# Patient Record
Sex: FEMALE | Race: OTHER | HISPANIC OR LATINO | URBAN - METROPOLITAN AREA
[De-identification: names, ages, dates, MRNs, and addresses within clinical notes are randomized per-mention and may not be internally consistent; named-entity substitution may affect disease eponyms.]

---

## 2023-11-24 ENCOUNTER — EMERGENCY (EMERGENCY)
Facility: HOSPITAL | Age: 23
LOS: 0 days | Discharge: HOME | End: 2023-11-24
Attending: EMERGENCY MEDICINE
Payer: COMMERCIAL

## 2023-11-24 VITALS
DIASTOLIC BLOOD PRESSURE: 75 MMHG | OXYGEN SATURATION: 100 % | TEMPERATURE: 98 F | HEART RATE: 62 BPM | SYSTOLIC BLOOD PRESSURE: 105 MMHG | RESPIRATION RATE: 17 BRPM

## 2023-11-24 VITALS — HEIGHT: 61 IN | WEIGHT: 134.92 LBS

## 2023-11-24 DIAGNOSIS — R22.31 LOCALIZED SWELLING, MASS AND LUMP, RIGHT UPPER LIMB: ICD-10-CM
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
## 2023-11-24 DIAGNOSIS — J45.909 UNSPECIFIED ASTHMA, UNCOMPLICATED: ICD-10-CM

## 2023-11-24 DIAGNOSIS — S60.221A CONTUSION OF RIGHT HAND, INITIAL ENCOUNTER: ICD-10-CM

## 2023-11-24 PROCEDURE — 99283 EMERGENCY DEPT VISIT LOW MDM: CPT | Mod: 25

## 2023-11-24 PROCEDURE — 73130 X-RAY EXAM OF HAND: CPT | Mod: RT

## 2023-11-24 PROCEDURE — 73130 X-RAY EXAM OF HAND: CPT | Mod: 26,RT

## 2023-11-24 PROCEDURE — 99284 EMERGENCY DEPT VISIT MOD MDM: CPT

## 2023-11-24 RX ORDER — ACETAMINOPHEN 500 MG
650 TABLET ORAL ONCE
Refills: 0 | Status: COMPLETED | OUTPATIENT
Start: 2023-11-24 | End: 2023-11-24

## 2023-11-24 RX ADMIN — Medication 650 MILLIGRAM(S): at 17:08

## 2023-11-24 NOTE — ED STATDOCS - CLINICAL SUMMARY MEDICAL DECISION MAKING FREE TEXT BOX
23 year old female with PMHx of asthma; presents to the ED c/o right hand swelling and pain, patient states she got her nails done and following accidentally hit her right hand while doing laundry. Right hand dominant. NKDA. Took Ibuprofen PTA.  xray, tylenol, dc

## 2023-11-24 NOTE — ED ADULT NURSE NOTE - OBJECTIVE STATEMENT
Pt came into the ED with c/o right sided hand pain after "banging her hand". Pt reports it happened after getting her nails done. pt denies any other symptoms or complaints at this time. pt given pain medications.

## 2023-11-24 NOTE — ED STATDOCS - PATIENT PORTAL LINK FT
You can access the FollowMyHealth Patient Portal offered by [facility] by registering at the following website: [email or web address]. By joining [employer]’s FollowMyHealth portal, you will also be able to view your health information using other applications (apps) compatible with our system.

## 2023-11-24 NOTE — ED STATDOCS - PROGRESS NOTE DETAILS
Emeli PAC: pt here with right hand pain after banging her fingers. no visible deformity. prelim xray neg. Discussed with patient need to return to ED if symptoms don't continue to improve or recur or develops any new or worsening symptoms that are of concern.

## 2023-11-24 NOTE — ED STATDOCS - NS ED ATTENDING STATEMENT MOD
This was a shared visit with the ANISHA. I reviewed and verified the documentation and independently performed the documented:

## 2023-11-24 NOTE — ED STATDOCS - OBJECTIVE STATEMENT
23 year old female with PMHx of asthma; presents to the ED c/o right hand swelling and pain, patient states she got her nails done and following accidentally hit her right hand while doing laundry. Right hand dominant. NKDA. Took Ibuprofen PTA.

## 2023-11-24 NOTE — ED STATDOCS - NSFOLLOWUPINSTRUCTIONS_ED_ALL_ED_FT
Contusion    A contusion is a deep bruise. Contusions are the result of a blunt injury to tissues and muscle fibers under the skin. The skin overlying the contusion may turn blue, purple, or yellow. Symptoms also include pain and swelling in the injured area.    SEEK IMMEDIATE MEDICAL CARE IF YOU HAVE ANY OF THE FOLLOWING SYMPTOMS: severe pain, numbness, tingling, pain, weakness, or skin color/temperature change in any part of your body distal to the injury.       Rest, ice, elevate area.  Please use 650mg tylenol (or acetaminophen) every 6 hours and 600mg motrin (or advil or ibuprofen) every 6 hours as needed for pain/discomfort/swelling.  Make sure not to use more than 3500mg in any 24 hour period.     In the Emergency Department today, we did not appreciate any abnormal findings on your xray. We will submit to our radiologist for FINAL review. If there are any new findings or concerning findings that were missed in the Emergency Department, we will notify you at the number provided upon registration.

## 2023-11-24 NOTE — ED STATDOCS - MUSCULOSKELETAL, MLM
range of motion is not limited and there is no muscle tenderness. Right distal thumb, middle finger and index finger has mild edema. No cellulitis, normal sensation, normal ROM, well-perfused

## 2023-11-24 NOTE — ED ADULT TRIAGE NOTE - CHIEF COMPLAINT QUOTE
pt c/o right hand pain and swelling s/p hitting hand. denies cp/sob/n/v/d/fever/chills. no significant pmh.
